# Patient Record
Sex: MALE | Race: WHITE | Employment: OTHER | URBAN - METROPOLITAN AREA
[De-identification: names, ages, dates, MRNs, and addresses within clinical notes are randomized per-mention and may not be internally consistent; named-entity substitution may affect disease eponyms.]

---

## 2017-01-27 PROCEDURE — 86431 RHEUMATOID FACTOR QUANT: CPT | Performed by: INTERNAL MEDICINE

## 2017-01-27 PROCEDURE — 86038 ANTINUCLEAR ANTIBODIES: CPT | Performed by: INTERNAL MEDICINE

## 2017-01-31 ENCOUNTER — TELEPHONE (OUTPATIENT)
Dept: SURGERY | Facility: HOSPITAL | Age: 67
End: 2017-01-31

## 2017-02-01 NOTE — TELEPHONE ENCOUNTER
Outside Facility Documentation           Operative Report      Procedure Date: 1/31/2017     Patient Name: Carolina Hui     Preoperative Diagnosis: LEFT URETERAL CALCULUS     Postoperative Diagnosis: same        Operative Report      Procedure Date: 1/31/2 guide a ureteral catheter into the left  ureteral orifice and water soluble contrast was injected retrograde, with the findings as described above.       The coaxial ureteral dilators were then passed over a guidewire up into the  Ureter, and the ureter wa

## 2017-03-29 PROBLEM — M15.9 PRIMARY OSTEOARTHRITIS INVOLVING MULTIPLE JOINTS: Status: ACTIVE | Noted: 2017-03-29

## 2017-03-29 PROCEDURE — 36415 COLL VENOUS BLD VENIPUNCTURE: CPT | Performed by: INTERNAL MEDICINE

## 2017-03-29 PROCEDURE — 86038 ANTINUCLEAR ANTIBODIES: CPT | Performed by: INTERNAL MEDICINE

## 2017-03-29 PROCEDURE — 86160 COMPLEMENT ANTIGEN: CPT | Performed by: INTERNAL MEDICINE

## 2017-03-29 PROCEDURE — 86225 DNA ANTIBODY NATIVE: CPT | Performed by: INTERNAL MEDICINE

## 2017-10-06 PROBLEM — J18.9 PNEUMONIA OF LEFT LOWER LOBE DUE TO INFECTIOUS ORGANISM: Status: ACTIVE | Noted: 2017-10-06

## 2018-01-05 PROCEDURE — 82570 ASSAY OF URINE CREATININE: CPT | Performed by: INTERNAL MEDICINE

## 2018-01-05 PROCEDURE — 82043 UR ALBUMIN QUANTITATIVE: CPT | Performed by: INTERNAL MEDICINE

## 2018-01-06 PROBLEM — E11.29 TYPE 2 DIABETES MELLITUS WITH MICROALBUMINURIA, WITHOUT LONG-TERM CURRENT USE OF INSULIN (HCC): Status: ACTIVE | Noted: 2018-01-06

## 2018-01-06 PROBLEM — R80.9 TYPE 2 DIABETES MELLITUS WITH MICROALBUMINURIA, WITHOUT LONG-TERM CURRENT USE OF INSULIN (HCC): Status: ACTIVE | Noted: 2018-01-06

## 2018-01-08 PROBLEM — J18.9 PNEUMONIA OF LEFT LOWER LOBE DUE TO INFECTIOUS ORGANISM: Status: RESOLVED | Noted: 2017-10-06 | Resolved: 2018-01-08

## 2018-02-22 PROBLEM — I51.89 LEFT VENTRICULAR DIASTOLIC DYSFUNCTION WITH PRESERVED SYSTOLIC FUNCTION: Status: ACTIVE | Noted: 2018-02-22

## 2018-02-22 PROBLEM — D69.6 PLATELETS DECREASED (HCC): Status: ACTIVE | Noted: 2018-02-22

## 2018-04-09 PROCEDURE — 81003 URINALYSIS AUTO W/O SCOPE: CPT | Performed by: INTERNAL MEDICINE

## 2018-07-09 PROBLEM — D69.6 PLATELETS DECREASED (HCC): Status: RESOLVED | Noted: 2018-02-22 | Resolved: 2018-07-09

## 2018-09-05 PROBLEM — M51.36 DDD (DEGENERATIVE DISC DISEASE), LUMBAR: Status: ACTIVE | Noted: 2018-09-05

## 2018-09-05 PROBLEM — M48.061 SPINAL STENOSIS OF LUMBAR REGION WITHOUT NEUROGENIC CLAUDICATION: Status: ACTIVE | Noted: 2018-09-05

## 2018-09-05 PROBLEM — Z98.1 S/P LUMBAR FUSION: Status: ACTIVE | Noted: 2018-09-05

## 2018-12-26 PROCEDURE — 81003 URINALYSIS AUTO W/O SCOPE: CPT | Performed by: NURSE PRACTITIONER

## 2019-02-11 PROCEDURE — 81003 URINALYSIS AUTO W/O SCOPE: CPT | Performed by: INTERNAL MEDICINE

## 2019-05-13 PROBLEM — M48.061 SPINAL STENOSIS OF LUMBAR REGION WITHOUT NEUROGENIC CLAUDICATION: Status: RESOLVED | Noted: 2018-09-05 | Resolved: 2019-05-13

## 2019-07-19 PROCEDURE — 87077 CULTURE AEROBIC IDENTIFY: CPT | Performed by: UROLOGY

## 2019-07-19 PROCEDURE — 87086 URINE CULTURE/COLONY COUNT: CPT | Performed by: UROLOGY

## 2019-08-20 PROBLEM — I11.0 HYPERTENSIVE HEART DISEASE WITH HEART FAILURE (HCC): Status: ACTIVE | Noted: 2019-08-20

## 2019-11-14 PROBLEM — J43.2 CENTRILOBULAR EMPHYSEMA (HCC): Status: ACTIVE | Noted: 2019-11-14

## 2019-11-14 PROBLEM — E11.51 TYPE 2 DIABETES MELLITUS WITH DIABETIC PERIPHERAL ANGIOPATHY WITHOUT GANGRENE, WITHOUT LONG-TERM CURRENT USE OF INSULIN (HCC): Status: ACTIVE | Noted: 2019-11-14

## 2019-11-14 PROBLEM — E27.9 ADRENAL ABNORMALITY (HCC): Status: ACTIVE | Noted: 2019-11-14

## 2019-11-14 PROBLEM — I71.9 AORTA ANEURYSM (HCC): Status: ACTIVE | Noted: 2019-11-14

## 2019-11-14 PROBLEM — I72.3 ANEURYSM ARTERY, ILIAC COMMON (HCC): Status: ACTIVE | Noted: 2019-11-14

## 2019-11-14 PROBLEM — E27.8 ADRENAL NODULE (HCC): Status: ACTIVE | Noted: 2019-11-14

## 2019-11-20 PROBLEM — I50.9 CHF (NYHA CLASS I, ACC/AHA STAGE B) (HCC): Status: ACTIVE | Noted: 2019-11-20

## 2020-03-13 PROBLEM — E11.21 TYPE 2 DIABETES MELLITUS WITH NEPHROPATHY (HCC): Status: ACTIVE | Noted: 2020-03-13

## 2020-08-25 PROBLEM — E11.21 TYPE 2 DIABETES MELLITUS WITH NEPHROPATHY (HCC): Status: RESOLVED | Noted: 2020-03-13 | Resolved: 2020-08-25

## 2020-08-25 PROBLEM — I51.89 LEFT VENTRICULAR DIASTOLIC DYSFUNCTION WITH PRESERVED SYSTOLIC FUNCTION: Status: RESOLVED | Noted: 2018-02-22 | Resolved: 2020-08-25

## 2021-02-17 PROBLEM — M15.9 PRIMARY OSTEOARTHRITIS INVOLVING MULTIPLE JOINTS: Status: RESOLVED | Noted: 2017-03-29 | Resolved: 2021-02-17

## 2021-02-17 PROBLEM — M51.36 DDD (DEGENERATIVE DISC DISEASE), LUMBAR: Status: RESOLVED | Noted: 2018-09-05 | Resolved: 2021-02-17

## 2021-02-17 PROBLEM — J43.2 CENTRILOBULAR EMPHYSEMA (HCC): Status: RESOLVED | Noted: 2019-11-14 | Resolved: 2021-02-17

## 2021-04-20 ENCOUNTER — LAB REQUISITION (OUTPATIENT)
Dept: LAB | Facility: HOSPITAL | Age: 71
End: 2021-04-20
Payer: MEDICARE

## 2021-04-20 DIAGNOSIS — N32.89 OTHER SPECIFIED DISORDERS OF BLADDER: ICD-10-CM

## 2021-04-20 PROCEDURE — 88307 TISSUE EXAM BY PATHOLOGIST: CPT | Performed by: UROLOGY

## 2021-05-17 PROBLEM — C67.9 MALIGNANT NEOPLASM OF URINARY BLADDER, UNSPECIFIED SITE (HCC): Status: ACTIVE | Noted: 2021-05-17

## 2021-05-17 PROBLEM — I71.9 AORTIC ANEURYSM WITHOUT RUPTURE, UNSPECIFIED PORTION OF AORTA (HCC): Status: ACTIVE | Noted: 2021-05-17

## 2021-08-17 PROBLEM — M48.062 SPINAL STENOSIS OF LUMBAR REGION WITH NEUROGENIC CLAUDICATION: Status: ACTIVE | Noted: 2021-08-17

## 2021-11-16 PROBLEM — I11.0 HYPERTENSIVE HEART DISEASE WITH HEART FAILURE (HCC): Status: RESOLVED | Noted: 2019-08-20 | Resolved: 2021-11-16

## 2021-11-16 PROBLEM — I13.0 HYPERTENSIVE HEART AND RENAL DISEASE WITH CONGESTIVE HEART FAILURE (HCC): Status: ACTIVE | Noted: 2021-11-16

## 2021-11-30 ENCOUNTER — LAB REQUISITION (OUTPATIENT)
Dept: LAB | Facility: HOSPITAL | Age: 71
End: 2021-11-30
Payer: MEDICARE

## 2021-11-30 DIAGNOSIS — C67.9 MALIGNANT NEOPLASM OF BLADDER, UNSPECIFIED (HCC): ICD-10-CM

## 2021-11-30 PROCEDURE — 88307 TISSUE EXAM BY PATHOLOGIST: CPT | Performed by: UROLOGY

## 2023-05-22 ENCOUNTER — ANESTHESIA (OUTPATIENT)
Dept: SURGERY | Facility: HOSPITAL | Age: 73
End: 2023-05-22
Payer: MEDICARE

## 2023-05-22 ENCOUNTER — ANESTHESIA EVENT (OUTPATIENT)
Dept: SURGERY | Facility: HOSPITAL | Age: 73
End: 2023-05-22
Payer: MEDICARE

## 2023-05-22 ENCOUNTER — HOSPITAL ENCOUNTER (OUTPATIENT)
Facility: HOSPITAL | Age: 73
Setting detail: HOSPITAL OUTPATIENT SURGERY
Discharge: HOME | End: 2023-05-22
Attending: UROLOGY | Admitting: UROLOGY
Payer: MEDICARE

## 2023-05-22 VITALS
OXYGEN SATURATION: 91 % | HEART RATE: 73 BPM | WEIGHT: 167 LBS | DIASTOLIC BLOOD PRESSURE: 82 MMHG | RESPIRATION RATE: 16 BRPM | SYSTOLIC BLOOD PRESSURE: 120 MMHG | HEIGHT: 67 IN | BODY MASS INDEX: 26.21 KG/M2 | TEMPERATURE: 98 F

## 2023-05-22 LAB
GLUCOSE BLDC GLUCOMTR-MCNC: 107 MG/DL (ref 70–99)
GLUCOSE BLDC GLUCOMTR-MCNC: 92 MG/DL (ref 70–99)

## 2023-05-22 PROCEDURE — 3E0K705 INTRODUCTION OF OTHER ANTINEOPLASTIC INTO GENITOURINARY TRACT, VIA NATURAL OR ARTIFICIAL OPENING: ICD-10-PCS | Performed by: UROLOGY

## 2023-05-22 PROCEDURE — 88305 TISSUE EXAM BY PATHOLOGIST: CPT | Performed by: UROLOGY

## 2023-05-22 PROCEDURE — 0TBB8ZX EXCISION OF BLADDER, VIA NATURAL OR ARTIFICIAL OPENING ENDOSCOPIC, DIAGNOSTIC: ICD-10-PCS | Performed by: UROLOGY

## 2023-05-22 PROCEDURE — 82962 GLUCOSE BLOOD TEST: CPT

## 2023-05-22 PROCEDURE — 0T5B8ZZ DESTRUCTION OF BLADDER, VIA NATURAL OR ARTIFICIAL OPENING ENDOSCOPIC: ICD-10-PCS | Performed by: UROLOGY

## 2023-05-22 RX ORDER — ACETAMINOPHEN 500 MG
1000 TABLET ORAL ONCE
Status: DISCONTINUED | OUTPATIENT
Start: 2023-05-22 | End: 2023-05-22 | Stop reason: HOSPADM

## 2023-05-22 RX ORDER — DEXTROSE MONOHYDRATE 25 G/50ML
50 INJECTION, SOLUTION INTRAVENOUS
Status: DISCONTINUED | OUTPATIENT
Start: 2023-05-22 | End: 2023-05-22

## 2023-05-22 RX ORDER — HYDROMORPHONE HYDROCHLORIDE 1 MG/ML
0.6 INJECTION, SOLUTION INTRAMUSCULAR; INTRAVENOUS; SUBCUTANEOUS EVERY 5 MIN PRN
Status: DISCONTINUED | OUTPATIENT
Start: 2023-05-22 | End: 2023-05-22

## 2023-05-22 RX ORDER — NICOTINE POLACRILEX 4 MG
30 LOZENGE BUCCAL
Status: DISCONTINUED | OUTPATIENT
Start: 2023-05-22 | End: 2023-05-22 | Stop reason: HOSPADM

## 2023-05-22 RX ORDER — LABETALOL HYDROCHLORIDE 5 MG/ML
10 INJECTION, SOLUTION INTRAVENOUS EVERY 10 MIN PRN
Status: COMPLETED | OUTPATIENT
Start: 2023-05-22 | End: 2023-05-22

## 2023-05-22 RX ORDER — ONDANSETRON 2 MG/ML
INJECTION INTRAMUSCULAR; INTRAVENOUS AS NEEDED
Status: DISCONTINUED | OUTPATIENT
Start: 2023-05-22 | End: 2023-05-22 | Stop reason: SURG

## 2023-05-22 RX ORDER — HYDROMORPHONE HYDROCHLORIDE 1 MG/ML
0.2 INJECTION, SOLUTION INTRAMUSCULAR; INTRAVENOUS; SUBCUTANEOUS EVERY 5 MIN PRN
Status: DISCONTINUED | OUTPATIENT
Start: 2023-05-22 | End: 2023-05-22

## 2023-05-22 RX ORDER — NICOTINE POLACRILEX 4 MG
15 LOZENGE BUCCAL
Status: DISCONTINUED | OUTPATIENT
Start: 2023-05-22 | End: 2023-05-22

## 2023-05-22 RX ORDER — SODIUM CHLORIDE, SODIUM LACTATE, POTASSIUM CHLORIDE, CALCIUM CHLORIDE 600; 310; 30; 20 MG/100ML; MG/100ML; MG/100ML; MG/100ML
INJECTION, SOLUTION INTRAVENOUS CONTINUOUS
Status: DISCONTINUED | OUTPATIENT
Start: 2023-05-22 | End: 2023-05-22

## 2023-05-22 RX ORDER — LIDOCAINE HYDROCHLORIDE 10 MG/ML
INJECTION, SOLUTION EPIDURAL; INFILTRATION; INTRACAUDAL; PERINEURAL AS NEEDED
Status: DISCONTINUED | OUTPATIENT
Start: 2023-05-22 | End: 2023-05-22 | Stop reason: SURG

## 2023-05-22 RX ORDER — HYDROMORPHONE HYDROCHLORIDE 1 MG/ML
0.4 INJECTION, SOLUTION INTRAMUSCULAR; INTRAVENOUS; SUBCUTANEOUS EVERY 5 MIN PRN
Status: DISCONTINUED | OUTPATIENT
Start: 2023-05-22 | End: 2023-05-22

## 2023-05-22 RX ORDER — NICOTINE POLACRILEX 4 MG
15 LOZENGE BUCCAL
Status: DISCONTINUED | OUTPATIENT
Start: 2023-05-22 | End: 2023-05-22 | Stop reason: HOSPADM

## 2023-05-22 RX ORDER — METOCLOPRAMIDE HYDROCHLORIDE 5 MG/ML
10 INJECTION INTRAMUSCULAR; INTRAVENOUS EVERY 8 HOURS PRN
Status: DISCONTINUED | OUTPATIENT
Start: 2023-05-22 | End: 2023-05-22

## 2023-05-22 RX ORDER — MORPHINE SULFATE 4 MG/ML
2 INJECTION, SOLUTION INTRAMUSCULAR; INTRAVENOUS EVERY 10 MIN PRN
Status: DISCONTINUED | OUTPATIENT
Start: 2023-05-22 | End: 2023-05-22

## 2023-05-22 RX ORDER — NICOTINE POLACRILEX 4 MG
30 LOZENGE BUCCAL
Status: DISCONTINUED | OUTPATIENT
Start: 2023-05-22 | End: 2023-05-22

## 2023-05-22 RX ORDER — MORPHINE SULFATE 4 MG/ML
4 INJECTION, SOLUTION INTRAMUSCULAR; INTRAVENOUS EVERY 10 MIN PRN
Status: DISCONTINUED | OUTPATIENT
Start: 2023-05-22 | End: 2023-05-22

## 2023-05-22 RX ORDER — CLINDAMYCIN PHOSPHATE 900 MG/50ML
900 INJECTION INTRAVENOUS ONCE
Status: COMPLETED | OUTPATIENT
Start: 2023-05-22 | End: 2023-05-22

## 2023-05-22 RX ORDER — DEXTROSE MONOHYDRATE 25 G/50ML
50 INJECTION, SOLUTION INTRAVENOUS
Status: DISCONTINUED | OUTPATIENT
Start: 2023-05-22 | End: 2023-05-22 | Stop reason: HOSPADM

## 2023-05-22 RX ORDER — NALOXONE HYDROCHLORIDE 0.4 MG/ML
80 INJECTION, SOLUTION INTRAMUSCULAR; INTRAVENOUS; SUBCUTANEOUS AS NEEDED
Status: DISCONTINUED | OUTPATIENT
Start: 2023-05-22 | End: 2023-05-22

## 2023-05-22 RX ORDER — ONDANSETRON 2 MG/ML
4 INJECTION INTRAMUSCULAR; INTRAVENOUS EVERY 6 HOURS PRN
Status: DISCONTINUED | OUTPATIENT
Start: 2023-05-22 | End: 2023-05-22

## 2023-05-22 RX ORDER — DEXAMETHASONE SODIUM PHOSPHATE 4 MG/ML
VIAL (ML) INJECTION AS NEEDED
Status: DISCONTINUED | OUTPATIENT
Start: 2023-05-22 | End: 2023-05-22 | Stop reason: SURG

## 2023-05-22 RX ORDER — MORPHINE SULFATE 10 MG/ML
6 INJECTION, SOLUTION INTRAMUSCULAR; INTRAVENOUS EVERY 10 MIN PRN
Status: DISCONTINUED | OUTPATIENT
Start: 2023-05-22 | End: 2023-05-22

## 2023-05-22 RX ADMIN — DEXAMETHASONE SODIUM PHOSPHATE 4 MG: 4 MG/ML VIAL (ML) INJECTION at 17:14:00

## 2023-05-22 RX ADMIN — ONDANSETRON 4 MG: 2 INJECTION INTRAMUSCULAR; INTRAVENOUS at 17:14:00

## 2023-05-22 RX ADMIN — LIDOCAINE HYDROCHLORIDE 50 MG: 10 INJECTION, SOLUTION EPIDURAL; INFILTRATION; INTRACAUDAL; PERINEURAL at 17:14:00

## 2023-05-22 RX ADMIN — CLINDAMYCIN PHOSPHATE 900 MG: 900 INJECTION INTRAVENOUS at 17:15:00

## 2023-05-22 RX ADMIN — SODIUM CHLORIDE, SODIUM LACTATE, POTASSIUM CHLORIDE, CALCIUM CHLORIDE: 600; 310; 30; 20 INJECTION, SOLUTION INTRAVENOUS at 17:04:00

## 2023-05-22 NOTE — ANESTHESIA PROCEDURE NOTES
Airway  Date/Time: 5/22/2023 5:12 PM  Urgency: Elective    Airway not difficult    General Information and Staff    Patient location during procedure: OR  Anesthesiologist: Jim Last MD  Performed: anesthesiologist   Performed by: Jim Last MD  Authorized by: Jim Last MD      Indications and Patient Condition  Indications for airway management: anesthesia  Spontaneous ventilation: present  Sedation level: deep  Preoxygenated: yes  Patient position: sniffing  Mask difficulty assessment: 1 - vent by mask    Final Airway Details  Final airway type: supraglottic airway      Successful airway: classic  Size 4       Number of attempts at approach: 1

## 2023-05-22 NOTE — ANESTHESIA POSTPROCEDURE EVALUATION
Patient: Seamus Hui    Procedure Summary     Date: 05/22/23 Room / Location: [facility] MAIN OR 14 / [facility] MAIN OR    Anesthesia Start: 3240 Anesthesia Stop:     Procedure: Cystoscopy, bladder biopsy with gemcitabine instillation (Bladder) Diagnosis: (Malignant neoplasm of urinary bladder)    Surgeons: Wing Iveth MD Anesthesiologist: Estefani Goodwin MD    Anesthesia Type: general ASA Status: 3          Anesthesia Type: general    Vitals Value Taken Time   /131 05/22/23 1743   Temp 97.8 05/22/23 1744   Pulse 116 05/22/23 1744   Resp 21 05/22/23 1744   SpO2 95 05/22/23 1744   Vitals shown include unvalidated device data.     [address] [place] [address] AN Post Evaluation:   Patient Evaluated in PACU  Patient Participation: complete - patient participated  Level of Consciousness: awake  Pain Score: 1  Pain Management: adequate  Airway Patency:patent  Dental exam unchanged from preop  Yes    Cardiovascular Status: acceptable  Respiratory Status: acceptable and nasal airway  Postoperative Hydration acceptable      Hayley Franks MD  5/22/2023 5:44 PM

## 2023-05-22 NOTE — OPERATIVE REPORT
[facility]  Brief Op Note    Josh Patience Swims Location: OR   CSN [id number] MRN [id number]   Admission Date 5/22/2023 Operation Date 5/22/2023   Attending Physician Marly Simpson MD Operating Physician Rao Pete MD     Pre-Operative Diagnosis: Malignant neoplasm of urinary bladder    Post-Operative Diagnosis: Same as above    Procedure Performed: Procedure(s):  Cystoscopy, bladder biopsy and fulguration (x3) with gemcitabine instillation    Anesthesia: General    EBL: none        Rao Pete MD  5/22/2023  5:35 PM

## 2023-05-23 NOTE — OPERATIVE REPORT
[facility]    PATIENT'S NAME: Deejay Meeks   ATTENDING PHYSICIAN: Elsie Matt MD   OPERATING PHYSICIAN: Elsie Matt MD   PATIENT ACCOUNT#:   [id number]    LOCATION:  [place] [address]  MEDICAL RECORD #:   [id number]       YOB: 1950  ADMISSION DATE:       05/22/2023      OPERATION DATE:  05/22/2023    OPERATIVE REPORT      PREOPERATIVE DIAGNOSIS:  History of transitional cell carcinoma of the bladder, currently with atypical cells and patchy findings on office cystoscopy. POSTOPERATIVE DIAGNOSIS:  History of transitional cell carcinoma of the bladder, currently with atypical cells and patchy findings on office cystoscopy. PROCEDURE:  Cystoscopy, bladder biopsy and fulguration (x3) with gemcitabine intravesical chemotherapy instillation. ANESTHESIA:  General.    ESTIMATED BLOOD LOSS:  None. SPECIMEN:  Bladder biopsy x3. DRAIN:  Dempsey. INDICATIONS:  The patient is a 26-year-old male with history of transitional cell carcinoma of the bladder. He shows up regularly after having a long period of time where he did not show for surveillance cystoscopy. At the latest cystoscopy, he had 2 patchy areas which showed up on cystoscopy. 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 He was recommended cystoscopy, bladder biopsy versus TURBT and fulguration with gemcitabine instillation. OPERATIVE TECHNIQUE:  Patient was brought to the operative suite, administered general anesthetic as well as IV antibiotic. He was placed in lithotomy position, prepped and draped in sterile fashion. A 22-Syriac cystoscope was inserted into the patient's urethra which appeared to be normal.  Prostatic urethra was short, somewhat obstructing; however, did not have any atypical findings in the mucosa. Bladder was endoscoped.   There was a slight erythema at the dome of the bladder at the area of his prior tumors as well as in the posterior bladder at the left and right lateral sides. Dome of the bladder as well as both posterior aspect of the bladder were biopsied. The area surrounding it was extensively fulgurated, appeared to be no other lesions or abnormalities. Following this, a 16-Icelandic Dempsey catheter was placed in the patient's bladder. Then, 100 mL of gemcitabine consisting of 2 g were instilled. The catheter was clamped. He will have the gemcitabine instillation for 1 hour. He will follow up in the office. We will wait pathology for further recommendations.     Dictated By Jalyn Underwood MD  d: 05/22/2023 17:37:00  t: 05/22/2023 23:24:09  Job [id number]  /

## (undated) DEVICE — MEDI-VAC NON-CONDUCTIVE SUCTION TUBING: Brand: CARDINAL HEALTH

## (undated) DEVICE — UROLOGY DRAIN BAG

## (undated) DEVICE — CYSTO PACK: Brand: MEDLINE INDUSTRIES, INC.

## (undated) DEVICE — TRAY SURESTEP 16 BARDEX DRAIN

## (undated) DEVICE — SLEEVE KENDALL SCD EXPRESS MED

## (undated) DEVICE — SOLUTION  .9 3000ML

## (undated) DEVICE — GAMMEX® PI HYBRID SIZE 7.5, STERILE POWDER-FREE SURGICAL GLOVE, POLYISOPRENE AND NEOPRENE BLEND: Brand: GAMMEX

## (undated) DEVICE — STERILE WATER 1000ML BTL

## (undated) DEVICE — ZIPWIRE GUIDEWIRE 035X150 STR